# Patient Record
Sex: FEMALE | Race: BLACK OR AFRICAN AMERICAN
[De-identification: names, ages, dates, MRNs, and addresses within clinical notes are randomized per-mention and may not be internally consistent; named-entity substitution may affect disease eponyms.]

---

## 2018-03-22 ENCOUNTER — HOSPITAL ENCOUNTER (INPATIENT)
Dept: HOSPITAL 17 - HSDC | Age: 48
LOS: 2 days | Discharge: HOME | DRG: 743 | End: 2018-03-24
Attending: OBSTETRICS & GYNECOLOGY | Admitting: OBSTETRICS & GYNECOLOGY
Payer: MEDICARE

## 2018-03-22 VITALS
RESPIRATION RATE: 16 BRPM | HEART RATE: 98 BPM | TEMPERATURE: 98 F | SYSTOLIC BLOOD PRESSURE: 138 MMHG | DIASTOLIC BLOOD PRESSURE: 80 MMHG | OXYGEN SATURATION: 98 %

## 2018-03-22 VITALS — WEIGHT: 149.25 LBS | BODY MASS INDEX: 28.18 KG/M2 | HEIGHT: 61 IN

## 2018-03-22 VITALS
SYSTOLIC BLOOD PRESSURE: 133 MMHG | RESPIRATION RATE: 17 BRPM | TEMPERATURE: 98.2 F | DIASTOLIC BLOOD PRESSURE: 82 MMHG | HEART RATE: 93 BPM

## 2018-03-22 DIAGNOSIS — J45.909: ICD-10-CM

## 2018-03-22 DIAGNOSIS — N80.3: ICD-10-CM

## 2018-03-22 DIAGNOSIS — N93.8: Primary | ICD-10-CM

## 2018-03-22 DIAGNOSIS — D25.1: ICD-10-CM

## 2018-03-22 DIAGNOSIS — I10: ICD-10-CM

## 2018-03-22 DIAGNOSIS — I34.0: ICD-10-CM

## 2018-03-22 DIAGNOSIS — N73.6: ICD-10-CM

## 2018-03-22 DIAGNOSIS — D50.0: ICD-10-CM

## 2018-03-22 DIAGNOSIS — M06.9: ICD-10-CM

## 2018-03-22 PROCEDURE — 88305 TISSUE EXAM BY PATHOLOGIST: CPT

## 2018-03-22 PROCEDURE — 86850 RBC ANTIBODY SCREEN: CPT

## 2018-03-22 PROCEDURE — 0UT90ZL RESECTION OF UTERUS, SUPRACERVICAL, OPEN APPROACH: ICD-10-PCS | Performed by: OBSTETRICS & GYNECOLOGY

## 2018-03-22 PROCEDURE — 0DNW0ZZ RELEASE PERITONEUM, OPEN APPROACH: ICD-10-PCS | Performed by: OBSTETRICS & GYNECOLOGY

## 2018-03-22 PROCEDURE — 80048 BASIC METABOLIC PNL TOTAL CA: CPT

## 2018-03-22 PROCEDURE — 86901 BLOOD TYPING SEROLOGIC RH(D): CPT

## 2018-03-22 PROCEDURE — 86900 BLOOD TYPING SEROLOGIC ABO: CPT

## 2018-03-22 PROCEDURE — 85025 COMPLETE CBC W/AUTO DIFF WBC: CPT

## 2018-03-22 PROCEDURE — 0UT70ZZ RESECTION OF BILATERAL FALLOPIAN TUBES, OPEN APPROACH: ICD-10-PCS | Performed by: OBSTETRICS & GYNECOLOGY

## 2018-03-22 PROCEDURE — 0UT20ZZ RESECTION OF BILATERAL OVARIES, OPEN APPROACH: ICD-10-PCS | Performed by: OBSTETRICS & GYNECOLOGY

## 2018-03-22 PROCEDURE — 88307 TISSUE EXAM BY PATHOLOGIST: CPT

## 2018-03-22 PROCEDURE — 93005 ELECTROCARDIOGRAM TRACING: CPT

## 2018-03-22 PROCEDURE — 94150 VITAL CAPACITY TEST: CPT

## 2018-03-22 RX ADMIN — ONDANSETRON PRN MG: 2 INJECTION, SOLUTION INTRAMUSCULAR; INTRAVENOUS at 20:51

## 2018-03-22 RX ADMIN — ACETAMINOPHEN SCH MLS/HR: 10 INJECTION, SOLUTION INTRAVENOUS at 18:16

## 2018-03-22 RX ADMIN — OXYTOCIN SCH MLS/HR: 10 INJECTION, SOLUTION INTRAMUSCULAR; INTRAVENOUS at 14:40

## 2018-03-22 RX ADMIN — OXYTOCIN SCH MLS/HR: 10 INJECTION, SOLUTION INTRAMUSCULAR; INTRAVENOUS at 20:51

## 2018-03-22 NOTE — EKG
Date Performed: 03/22/2018       Time Performed: 10:01:00

 

PTAGE:      47 years

 

EKG:      Sinus rhythm 

 

 NORMAL ECG Compared to prior electrocardiogram, Probably no significant change. 

 

 PREVIOUS TRACING            : 12/04/2009 12.11

 

DOCTOR:   Jonny Whitney  Interpretating Date/Time  03/22/2018 13:20:05

## 2018-03-22 NOTE — PD.OP
__________________________________________________





Operative Report


Date of Surgery:  Mar 22, 2018


Preoperative Diagnosis:  


(1) DUB (dysfunctional uterine bleeding)


(2) Intramural leiomyoma of uterus


(3) Iron deficiency anemia due to chronic blood loss


(4) Pelvic and perineal pain


Postoperative Diagnosis:  


(1) DUB (dysfunctional uterine bleeding)


(2) Intramural leiomyoma of uterus


(3) Iron deficiency anemia due to chronic blood loss


(4) Pelvic and perineal pain


(5) Endometriosis of pelvis


Procedure:


1. lysis of adhesions


2. abdominal supracervical hysterectomy


3. BSO


Anesthesia:


STEPHANIEA


Surgeon:


Ava Lentz


Assistant(s):


OR Staff


Operation and Findings:


IVF: 1200 ml LR + IV antibiotic given prior to surgery + fluorescent dye


EBL: 135 ml


UO: 150 ml


Surgical findings: 


1. abdominal and pelvic adhesions


2. extensive, multiple lesions of endometriosis


3. left endometrioma


4. enlarged uterus


5. normal ovaries and tubes


Specimens: uterus, tubes and ovaries


Complications: none


Condition: stable


Disposition: PACU





Description of the procedure:  The risks, benefits and alternatives of the 

procedure were discussed with the patient. Informed consent was obtained after 

questions were answered. The patient was then transferred to the operating room 

with her IV running. Antibiotics were given prior to surgery. She was placed in 

the supine position and was given general anesthesia without difficulties or 

complications. The patient was placed in the dorsal lithotomy position and was 

prepped and draped in the usual sterile fashion .





Attention was then turned to the patient's pelvis. A bivalve speculum was 

placed inside the patient's vagina. The anterior aspect of the cervix was 

grasped with a single tooth tenaculum for manipulation. The cervix was 

carefully dilated. A uterine manipulator was placed inside the uterus. The rest 

of the instruments were removed from the patient's vagina. 





The surgeon changed gloves and attention was then turned to the patient's 

abdomen. Next, a vertical umbilical skin incision was made with the scalpel. A 

5mm trocar was placed inside the abdomen while observing with the camera. A 

pneumoperitoneum was created with CO2 gas. Multiple adhesions and extensive 

endometriosis were noted. The decision was then made to proceed with open 

procedure due to lack of adequate visualization. All the instruments were 

removed from the patient's abdomen. The uterine manipulator was also removed 

and the patient's position was changed to a supine position.





A Pfannenstiel skin incision was then made with the scalpel. The incision was 

extended to the fascia with the Bovie. The fascia was incised in the midline 

with a scalpel. The fascial incision was then extended sharply with Li 

scissors. Next, the rectus muscles were  in the midline with a 

hemostat and the peritoneum was identified and entered bluntly. Lysis of 

adhesions was needed several times during the procedure in order to proceed 

with surgery. A Mona retractor was placed inside the patient's abdomen. Care 

was taken to place moist laps under the blades. The bowel was carefully packed 

away with moist laparotomy sponges. A bladder blade was also introduced inside 

the pelvic cavity. The left round ligament was identified, doubly clamped, 

transected and suture ligated with 0-Vicryl. Adequate hemostasis was noted. The 

left infundibulopelvic ligament was doubly clamped, transected and suture 

ligated times two with 0-Vicryl. The same was done on the right side after 

lysis of adhesions allowed the procedure to continue. Good hemostasis was noted 

as well. The tissues along the uterus were serially doubly clamped, transected 

and suture ligated times two with 0-Vicryl. Good hemostasis was noted.  The 

utero-cervical junction was transected with the Bovie. The tissues were removed 

and sent to pathology. Running, locked stitches of 0-Vicryl were used along the 

cervix and vaginal cuff angles in order to achieve excellent hemostasis. 

Copious irrigation was done several times. The ureters were not identified well 

due to the difficulty of the surgery and the adhesions encountered. Iridescent 

dye was given at the beginning of the surgery; no spillage or blockage was 

noted.





Excellent hemostasis was noted at all the surgical sites and pedicles. Surgicel 

powder was placed over the cervix and surgical sites. All the instruments were 

removed from the patient's pelvis and vagina. The instrument count was correct 

times three. The bowel was carefully inspected and noted to be intact. The 

peritoneum was closed with running stitches of 0 Vicryl. The rectus muscles 

were reapproximated with running stitches of 0-Vicryl. The fascia was 

reapproximated with running stitches of looped 0-PDS. The subcutaneous tissues 

were copiously irrigated and reapproximated with running stitches using  2-0 

Vicryl. The skin was reapproximated with running stitches of Monocryl on a 

curved needle. Mastisol and steri strips were placed over the incision. The 

umbilical skin incision was also closed with Monocryl and covered with band 

aids.





The patient was successfully extubated and taken to PACU in stable condition. 

Since the patient had to be open, arrangements were made for inpatient 

admission.





Note: I discussed surgical findings and surgical procedures with patient's 

sister. Her questions were answered. She verbalized understanding and agreement 

to the procedures done.











Ava Lentz MD Mar 22, 2018 08:02

## 2018-03-23 VITALS
DIASTOLIC BLOOD PRESSURE: 82 MMHG | SYSTOLIC BLOOD PRESSURE: 142 MMHG | HEART RATE: 94 BPM | OXYGEN SATURATION: 100 % | RESPIRATION RATE: 18 BRPM | TEMPERATURE: 98.8 F

## 2018-03-23 VITALS
TEMPERATURE: 97.7 F | DIASTOLIC BLOOD PRESSURE: 95 MMHG | HEART RATE: 98 BPM | RESPIRATION RATE: 20 BRPM | SYSTOLIC BLOOD PRESSURE: 143 MMHG

## 2018-03-23 VITALS
RESPIRATION RATE: 20 BRPM | OXYGEN SATURATION: 99 % | DIASTOLIC BLOOD PRESSURE: 94 MMHG | SYSTOLIC BLOOD PRESSURE: 143 MMHG | TEMPERATURE: 98.2 F | HEART RATE: 98 BPM

## 2018-03-23 VITALS
TEMPERATURE: 98.8 F | SYSTOLIC BLOOD PRESSURE: 117 MMHG | HEART RATE: 103 BPM | DIASTOLIC BLOOD PRESSURE: 77 MMHG | RESPIRATION RATE: 16 BRPM | OXYGEN SATURATION: 99 %

## 2018-03-23 VITALS
DIASTOLIC BLOOD PRESSURE: 82 MMHG | RESPIRATION RATE: 20 BRPM | HEART RATE: 97 BPM | SYSTOLIC BLOOD PRESSURE: 142 MMHG | OXYGEN SATURATION: 99 % | TEMPERATURE: 98.4 F

## 2018-03-23 VITALS
SYSTOLIC BLOOD PRESSURE: 123 MMHG | DIASTOLIC BLOOD PRESSURE: 82 MMHG | TEMPERATURE: 98.2 F | HEART RATE: 90 BPM | RESPIRATION RATE: 18 BRPM

## 2018-03-23 LAB
BASOPHILS # BLD AUTO: 0.1 TH/MM3 (ref 0–0.2)
BASOPHILS NFR BLD: 0.5 % (ref 0–2)
BUN SERPL-MCNC: 7 MG/DL (ref 7–18)
CALCIUM SERPL-MCNC: 8.9 MG/DL (ref 8.5–10.1)
CHLORIDE SERPL-SCNC: 103 MEQ/L (ref 98–107)
CREAT SERPL-MCNC: 0.59 MG/DL (ref 0.5–1)
EOSINOPHIL # BLD: 0 TH/MM3 (ref 0–0.4)
EOSINOPHIL NFR BLD: 0.1 % (ref 0–4)
ERYTHROCYTE [DISTWIDTH] IN BLOOD BY AUTOMATED COUNT: 14.3 % (ref 11.6–17.2)
GFR SERPLBLD BASED ON 1.73 SQ M-ARVRAT: 132 ML/MIN (ref 89–?)
GLUCOSE SERPL-MCNC: 124 MG/DL (ref 74–106)
HCO3 BLD-SCNC: 26.6 MEQ/L (ref 21–32)
HCT VFR BLD CALC: 33.5 % (ref 35–46)
HGB BLD-MCNC: 11.2 GM/DL (ref 11.6–15.3)
LYMPHOCYTES # BLD AUTO: 1.4 TH/MM3 (ref 1–4.8)
LYMPHOCYTES NFR BLD AUTO: 10.8 % (ref 9–44)
MCH RBC QN AUTO: 27.8 PG (ref 27–34)
MCHC RBC AUTO-ENTMCNC: 33.5 % (ref 32–36)
MCV RBC AUTO: 82.8 FL (ref 80–100)
MONOCYTE #: 1.6 TH/MM3 (ref 0–0.9)
MONOCYTES NFR BLD: 12.2 % (ref 0–8)
NEUTROPHILS # BLD AUTO: 9.8 TH/MM3 (ref 1.8–7.7)
NEUTROPHILS NFR BLD AUTO: 76.4 % (ref 16–70)
PLATELET # BLD: 303 TH/MM3 (ref 150–450)
PMV BLD AUTO: 9.1 FL (ref 7–11)
RBC # BLD AUTO: 4.05 MIL/MM3 (ref 4–5.3)
SODIUM SERPL-SCNC: 139 MEQ/L (ref 136–145)
WBC # BLD AUTO: 12.8 TH/MM3 (ref 4–11)

## 2018-03-23 RX ADMIN — ACETAMINOPHEN SCH MLS/HR: 10 INJECTION, SOLUTION INTRAVENOUS at 06:07

## 2018-03-23 RX ADMIN — MORPHINE SULFATE PRN MG: 2 INJECTION, SOLUTION INTRAMUSCULAR; INTRAVENOUS at 02:37

## 2018-03-23 RX ADMIN — METOPROLOL TARTRATE SCH MG: 25 TABLET, FILM COATED ORAL at 20:00

## 2018-03-23 RX ADMIN — OXYCODONE HYDROCHLORIDE AND ACETAMINOPHEN PRN TAB: 10; 325 TABLET ORAL at 16:44

## 2018-03-23 RX ADMIN — POTASSIUM CHLORIDE SCH MEQ: 20 TABLET, EXTENDED RELEASE ORAL at 14:46

## 2018-03-23 RX ADMIN — OXYCODONE HYDROCHLORIDE AND ACETAMINOPHEN PRN TAB: 10; 325 TABLET ORAL at 20:45

## 2018-03-23 RX ADMIN — LISINOPRIL SCH MG: 20 TABLET ORAL at 14:46

## 2018-03-23 RX ADMIN — ONDANSETRON PRN MG: 2 INJECTION, SOLUTION INTRAMUSCULAR; INTRAVENOUS at 06:18

## 2018-03-23 RX ADMIN — OXYCODONE HYDROCHLORIDE AND ACETAMINOPHEN PRN TAB: 10; 325 TABLET ORAL at 11:03

## 2018-03-23 RX ADMIN — MORPHINE SULFATE PRN MG: 2 INJECTION, SOLUTION INTRAMUSCULAR; INTRAVENOUS at 00:30

## 2018-03-23 RX ADMIN — ACETAMINOPHEN SCH MLS/HR: 10 INJECTION, SOLUTION INTRAVENOUS at 00:30

## 2018-03-23 RX ADMIN — POTASSIUM CHLORIDE SCH MEQ: 20 TABLET, EXTENDED RELEASE ORAL at 21:00

## 2018-03-23 RX ADMIN — OXYTOCIN SCH MLS/HR: 10 INJECTION, SOLUTION INTRAMUSCULAR; INTRAVENOUS at 05:31

## 2018-03-23 RX ADMIN — METOPROLOL TARTRATE SCH MG: 25 TABLET, FILM COATED ORAL at 11:02

## 2018-03-23 NOTE — HHI.PR
Subjective


Remarks


Doing well, pain is well controlled, hasn't had breakfast yet; denies N/V





Objective


Vital Signs





 Laboratory Tests








Test


  3/23/18


07:36


 


White Blood Count 12.8 TH/MM3 


 


Red Blood Count 4.05 MIL/MM3 


 


Hemoglobin 11.2 GM/DL 


 


Hematocrit 33.5 % 


 


Mean Corpuscular Volume 82.8 FL 


 


Mean Corpuscular Hemoglobin 27.8 PG 


 


Mean Corpuscular Hemoglobin


Concent 33.5 % 


 


 


Red Cell Distribution Width 14.3 % 


 


Platelet Count 303 TH/MM3 


 


Mean Platelet Volume 9.1 FL 


 


Neutrophils (%) (Auto) 76.4 % 


 


Lymphocytes (%) (Auto) 10.8 % 


 


Monocytes (%) (Auto) 12.2 % 


 


Eosinophils (%) (Auto) 0.1 % 


 


Basophils (%) (Auto) 0.5 % 


 


Neutrophils # (Auto) 9.8 TH/MM3 


 


Lymphocytes # (Auto) 1.4 TH/MM3 


 


Monocytes # (Auto) 1.6 TH/MM3 


 


Eosinophils # (Auto) 0.0 TH/MM3 


 


Basophils # (Auto) 0.1 TH/MM3 


 


CBC Comment DIFF FINAL 


 


Differential Comment  


 


Blood Urea Nitrogen 7 MG/DL 


 


Creatinine 0.59 MG/DL 


 


Random Glucose 124 MG/DL 


 


Calcium Level 8.9 MG/DL 


 


Sodium Level 139 MEQ/L 


 


Potassium Level 3.4 MEQ/L 


 


Chloride Level 103 MEQ/L 


 


Carbon Dioxide Level 26.6 MEQ/L 


 


Anion Gap 9 MEQ/L 


 


Estimat Glomerular Filtration


Rate 132 ML/MIN 


 








Vital Signs








  Date Time  Temp Pulse Resp B/P (MAP) Pulse Ox O2 Delivery O2 Flow Rate FiO2


 


3/23/18 05:24 98.2 90 18 123/82 (96)    


 


3/23/18 01:08 97.7 98 20 143/95 (111)    


 


3/22/18 20:56 98.2 93 17 133/82 (99)    


 


3/22/18 16:15 98.0 98 16 138/80 (99) 98   





    Manual Cuff/Auscultation    


 


3/22/18 16:00 98.8 108 15 113/74 (87) 97 Room Air  


 


3/22/18 15:30  103 14 127/79 (95) 95 Room Air  


 


3/22/18 15:15  103 12 133/84 (100) 97 Room Air  


 


3/22/18 15:00  104 16 131/81 (98) 96 Room Air  


 


3/22/18 14:45  102 15 161/81 (107) 94 Room Air  


 


3/22/18 14:33 99.3 99 15 157/83 (107) 100 Room Air  


 


3/22/18 10:00 98.7 98 18 149/90 (109) 100   














I/O      


 


 3/22/18 3/22/18 3/22/18 3/23/18 3/23/18 3/23/18





 07:00 15:00 23:00 07:00 15:00 23:00


 


Intake Total  1200 ml    


 


Output Total  285 ml 150 ml   


 


Balance  915 ml -150 ml   


 


      


 


Intake IV Total  1200 ml    


 


Output Urine Total  150 ml 150 ml   


 


Estimated Blood Loss  135 ml    








Objective Remarks


Chest is clear, regular rate and rhythm.


Abdomen is soft and non-distended.


Incision is clean and dry.


Ext no CCE.





A/P


Assessment and Plan


Post Op Day 1


1. stable


2. Doing well


3. will reassess in the afternoon


4. social consult initiated by RN in response to patient's needs/requests


5. will d/c home tomorrow and return to office in two weeks.











Ava Lentz MD Mar 23, 2018 07:25

## 2018-03-23 NOTE — HHI.DCPOC
Discharge Care Plan


Diagnosis:  


(1) DUB (dysfunctional uterine bleeding)


(2) Intramural leiomyoma of uterus


(3) Iron deficiency anemia due to chronic blood loss


(4) Pelvic and perineal pain


(5) Endometriosis of pelvis


Report Symptoms to Your Doctor


-Temperature above 100.5 degrees


-Redness, of incision or excessive or foul smelling drainage


-Unusual pain or calf pain


-Increased vaginal bleeding


-Painful or difficulty urinating


-Feelings of extreme sadness or anxiety after 2 weeks


Goals to Promote Your Health


* To prevent worsening of your condition and complications


* To maintain your health at the optimal level


Directions to Meet Your Goals


*** Take your medications as prescribed


*** Follow your dietary instruction


*** Follow activity as directed


*** Ensure plenty of rest for recovery


*** Drink fluids for hydration








*** Keep your appointments as scheduled


*** Take your immunizations and boosters as scheduled


*** If your symptoms worsen call your PCP, if no PCP go to Urgent Care Center 

or Emergency Room***


*** Smoking is Dangerous to Your Health. Avoid second hand smoke***


***Call the 24-hour crisis hotline for domestic abuse at 1-266.185.9404***











Ava Lentz MD Mar 23, 2018 16:15

## 2018-03-24 VITALS
SYSTOLIC BLOOD PRESSURE: 113 MMHG | OXYGEN SATURATION: 98 % | HEART RATE: 111 BPM | RESPIRATION RATE: 16 BRPM | TEMPERATURE: 98.3 F | DIASTOLIC BLOOD PRESSURE: 78 MMHG

## 2018-03-24 VITALS
RESPIRATION RATE: 20 BRPM | HEART RATE: 99 BPM | TEMPERATURE: 98.2 F | SYSTOLIC BLOOD PRESSURE: 110 MMHG | OXYGEN SATURATION: 98 % | DIASTOLIC BLOOD PRESSURE: 66 MMHG

## 2018-03-24 VITALS
RESPIRATION RATE: 18 BRPM | SYSTOLIC BLOOD PRESSURE: 119 MMHG | HEART RATE: 86 BPM | OXYGEN SATURATION: 97 % | DIASTOLIC BLOOD PRESSURE: 53 MMHG | TEMPERATURE: 98.4 F

## 2018-03-24 VITALS — DIASTOLIC BLOOD PRESSURE: 83 MMHG | RESPIRATION RATE: 18 BRPM | HEART RATE: 97 BPM | SYSTOLIC BLOOD PRESSURE: 130 MMHG

## 2018-03-24 RX ADMIN — METOPROLOL TARTRATE SCH MG: 25 TABLET, FILM COATED ORAL at 08:00

## 2018-03-24 RX ADMIN — OXYCODONE HYDROCHLORIDE AND ACETAMINOPHEN PRN TAB: 10; 325 TABLET ORAL at 04:58

## 2018-03-24 RX ADMIN — LISINOPRIL SCH MG: 20 TABLET ORAL at 09:53

## 2018-03-24 RX ADMIN — POTASSIUM CHLORIDE SCH MEQ: 20 TABLET, EXTENDED RELEASE ORAL at 09:53

## 2018-03-24 RX ADMIN — OXYCODONE HYDROCHLORIDE AND ACETAMINOPHEN PRN TAB: 10; 325 TABLET ORAL at 09:58
